# Patient Record
(demographics unavailable — no encounter records)

---

## 2024-10-23 NOTE — HISTORY OF PRESENT ILLNESS
[de-identified] : The patient comes in today for his right knee. He decided he wants to proceed with surgical intervention.

## 2024-10-23 NOTE — DISCUSSION/SUMMARY
[de-identified] : At this time, due to end-stage osteoarthritis with bone-on-bone articulation of the right knee. as supported by x-ray or MRI that demonstrated one or more of the following: periarticular osteophytes and joint space narrowing and since the patient has failed all conservative modalities for three months or more, including anti-inflammatory medication, analgesic medication, home exercises, physical therapy, cortisone shots and viscosupplementation therapies, I recommended a right total knee arthroplasty.   There are no severe medical contraindications for surgery. The patient does not have any of the following contraindications for total joint replacement surgery: Active infection; of active systemic bacteremia; or active skin infection or open wound at surgical site; or neuropathic arthritis; or severe, rapidly progressive neurologic disease; or severe medical conditions that make the risks of surgery outweigh the potential benefit.   All the risks and benefits of the surgery, including, but not limited to, anesthesia, infection, nerve and/or vascular injury, need for further surgery, DVT, PE and perioperative death were all discussed with the patient at length. In light of these, the patient wishes to proceed. The patient will be scheduled at this time.   I reviewed the plan of care, as well as a model of a total knee implant equivalent to the one that will be used for their total knee joint replacement.   The patient agreed to the plan of care, as well as the use of implants for their right knee total joint replacement.

## 2024-10-23 NOTE — DISCUSSION/SUMMARY
[de-identified] : At this time, due to end-stage osteoarthritis with bone-on-bone articulation of the right knee. as supported by x-ray or MRI that demonstrated one or more of the following: periarticular osteophytes and joint space narrowing and since the patient has failed all conservative modalities for three months or more, including anti-inflammatory medication, analgesic medication, home exercises, physical therapy, cortisone shots and viscosupplementation therapies, I recommended a right total knee arthroplasty.   There are no severe medical contraindications for surgery. The patient does not have any of the following contraindications for total joint replacement surgery: Active infection; of active systemic bacteremia; or active skin infection or open wound at surgical site; or neuropathic arthritis; or severe, rapidly progressive neurologic disease; or severe medical conditions that make the risks of surgery outweigh the potential benefit.   All the risks and benefits of the surgery, including, but not limited to, anesthesia, infection, nerve and/or vascular injury, need for further surgery, DVT, PE and perioperative death were all discussed with the patient at length. In light of these, the patient wishes to proceed. The patient will be scheduled at this time.   I reviewed the plan of care, as well as a model of a total knee implant equivalent to the one that will be used for their total knee joint replacement.   The patient agreed to the plan of care, as well as the use of implants for their right knee total joint replacement.

## 2024-10-23 NOTE — ADDENDUM
[FreeTextEntry1] : This note was written by Fleicita Avila on 10/23/2024 acting as a scribe for JOSE GARCIA III, MD

## 2024-10-23 NOTE — PHYSICAL EXAM
[de-identified] : Right Knee: Range of Motion in Degrees                                             Claimant:              Normal: Flexion Active:                        120                135-degrees Flexion Passive:                     120                135-degrees Extension Active:                    10                   0-5-degrees Extension Passive:                 10                   0-5-degrees     No weakness to flexion/extension. No evidence of instability in the AP plane or varus or valgus stress. Negative Lachman. Negative pivot shift.  Negative anterior drawer test.  Negative posterior drawer test. Negative Jose Roberto. Negative Apley grind.  No medial or lateral joint line tenderness.  Positive tenderness over the medial and lateral facet of the patella.  Positive patellofemoral crepitations.  No lateral tilting patella.  No patella apprehension.  Positive crepitation in the medial and lateral femoral condyle.  No proximal or distal swelling, edema or tenderness.  No gross motor or sensory deficits.  Mild intra-articular swelling. 2+ DP and PT pulses. No varus or valgus malalignment. Skin is intact. No rashes, scars or lesions.         [de-identified] : Ambulating with a slightly antalgic to antalgic gait.  Station:  Normal.  [de-identified] : Appearance:  Well-developed, well-nourished male in no acute distress.   [de-identified] : Radiographs, which were taken in the office today, one view of the right knee and one view of the left knee, including AP Standing, show near bone-on-bone arthritis of the right knee.

## 2024-10-23 NOTE — PHYSICAL EXAM
[de-identified] : Right Knee: Range of Motion in Degrees                                             Claimant:              Normal: Flexion Active:                        120                135-degrees Flexion Passive:                     120                135-degrees Extension Active:                    10                   0-5-degrees Extension Passive:                 10                   0-5-degrees     No weakness to flexion/extension. No evidence of instability in the AP plane or varus or valgus stress. Negative Lachman. Negative pivot shift.  Negative anterior drawer test.  Negative posterior drawer test. Negative Jose Roberto. Negative Apley grind.  No medial or lateral joint line tenderness.  Positive tenderness over the medial and lateral facet of the patella.  Positive patellofemoral crepitations.  No lateral tilting patella.  No patella apprehension.  Positive crepitation in the medial and lateral femoral condyle.  No proximal or distal swelling, edema or tenderness.  No gross motor or sensory deficits.  Mild intra-articular swelling. 2+ DP and PT pulses. No varus or valgus malalignment. Skin is intact. No rashes, scars or lesions.         [de-identified] : Ambulating with a slightly antalgic to antalgic gait.  Station:  Normal.  [de-identified] : Appearance:  Well-developed, well-nourished male in no acute distress.   [de-identified] : Radiographs, which were taken in the office today, one view of the right knee and one view of the left knee, including AP Standing, show near bone-on-bone arthritis of the right knee.

## 2024-10-23 NOTE — ADDENDUM
[FreeTextEntry1] : This note was written by Felicita Avila on 10/23/2024 acting as a scribe for JOSE GARCIA III, MD

## 2024-10-23 NOTE — HISTORY OF PRESENT ILLNESS
[de-identified] : The patient comes in today for his right knee. He decided he wants to proceed with surgical intervention.

## 2024-10-31 NOTE — CARDIOLOGY SUMMARY
[de-identified] : 6/20/24: RSR, prominent R wave V1, low voltage 10/31/24 NSR Similar to prior  [de-identified] : 6/2023:  NL LVF, Mild LVH, Mild MR/TR/NV [de-identified] : Nuclear stress test 1/18/22 No evidence of myocardial scar or Ischemia

## 2024-10-31 NOTE — PHYSICAL EXAM
Physical Therapy    Patient not seen in therapy.     Unavailable due to request no therapy today.      Re-attempt plan: per established plan of care    PT session attempted. Patient initially agreeable but then declines once beginning session. Encouragement and education provided, but patient continues to refuse.        OBJECTIVE                         Therapy procedure time and total treatment time can be found documented on the Time Entry flowsheet   [Normal S1, S2] : normal S1, S2 [Soft] : abdomen soft [Non Tender] : non-tender [Normal Gait] : normal gait [No Edema] : no edema [Normal] : moves all extremities, no focal deficits, normal speech [Alert and Oriented] : alert and oriented

## 2024-10-31 NOTE — DISCUSSION/SUMMARY
[FreeTextEntry1] : Here today for cardiac pre op evaluation prior to Right knee surgery,  He offers no complaints Cardiac testing/Medications reviewed  Patient optimized from cardiac standpoint to proceed with proposed surgery  DVT prophylaxis per surgery Labs to be reviewed with PST  [EKG obtained to assist in diagnosis and management of assessed problem(s)] : EKG obtained to assist in diagnosis and management of assessed problem(s)

## 2024-10-31 NOTE — HISTORY OF PRESENT ILLNESS
[FreeTextEntry1] : 76 yo male PMH: HTN, syncope, OA, presents today for pre op cardiac evaluation prior to Right knee surgery scheduled for 11/12/24 Claims bong feeling well no cardiovascular complaints

## 2024-12-09 NOTE — HISTORY OF PRESENT ILLNESS
[de-identified] : Post op Right Knee [de-identified] : The patient comes in today stating he feels markedly improved.  [de-identified] : Right Knee:  Range of Motion in Degrees	 	                       Claimant:	        Normal:	 Flexion Active	           125 	  135-degrees	 Flexion Passive	   125	  135-degrees	 Extension Active	    5             0-5-degrees	 Extension Passive	    5	           0-5-degrees	  No instability. The wounds are well-healed.   [de-identified] : Radiographs, which were taken in the office today, two views of right knee, show excellent position of the implant. [de-identified] : Status post right total knee arthroplasty [de-identified] : At this time, the patient is progressing nicely status post right total knee arthroplasty. I recommended outpatient therapy. He will be reassessed in two months.

## 2024-12-09 NOTE — HISTORY OF PRESENT ILLNESS
[de-identified] : Post op Right Knee [de-identified] : The patient comes in today stating he feels markedly improved.  [de-identified] : Right Knee:  Range of Motion in Degrees	 	                       Claimant:	        Normal:	 Flexion Active	           125 	  135-degrees	 Flexion Passive	   125	  135-degrees	 Extension Active	    5             0-5-degrees	 Extension Passive	    5	           0-5-degrees	  No instability. The wounds are well-healed.   [de-identified] : Radiographs, which were taken in the office today, two views of right knee, show excellent position of the implant. [de-identified] : Status post right total knee arthroplasty [de-identified] : At this time, the patient is progressing nicely status post right total knee arthroplasty. I recommended outpatient therapy. He will be reassessed in two months.

## 2024-12-09 NOTE — ADDENDUM
[FreeTextEntry1] : This note was written by Felicita Avila on 12/09/2024 acting as a scribe for JOSE GARCIA III, MD

## 2025-01-16 NOTE — HISTORY OF PRESENT ILLNESS
[FreeTextEntry1] : 76 yo male PMH: HTN, syncope, OA, presents today after right knee surgery. Pt denies chest pain or shortness of breath. He is exercising at home. He is taking propanolol for tremors and HTN. Pt sees Dr. Scott as his primary. Pt reports visits with him.

## 2025-01-16 NOTE — DISCUSSION/SUMMARY
[FreeTextEntry1] : Here today for follow up for HTN He offers no complaints Cardiac testing/Medications reviewed  Pt will be followed in 6 months. [EKG obtained to assist in diagnosis and management of assessed problem(s)] : EKG obtained to assist in diagnosis and management of assessed problem(s)

## 2025-01-16 NOTE — CARDIOLOGY SUMMARY
[de-identified] : 6/20/24: RSR, prominent R wave V1, low voltage 10/31/24 NSR Similar to prior  [de-identified] : 6/2023:  NL LVF, Mild LVH, Mild MR/TR/KY [de-identified] : Nuclear stress test 1/18/22 No evidence of myocardial scar or Ischemia

## 2025-01-28 NOTE — DISCUSSION/SUMMARY
[FreeTextEntry1] : 75-year-old man right-handed with essential tremor, doing well on present therapy.  Reviewed and discussed treatment.  No change this time. Discussed other options such as pacemaker, High-frequency sound treatment.  At this time we will continue with present management. Return to office, 9 months.

## 2025-01-28 NOTE — DATA REVIEWED
[de-identified] :   ACC: [id number] EXAM: CT BRAIN  PROCEDURE DATE: 12/21/2021    INTERPRETATION: CLINICAL STATEMENT: Pain.  TECHNIQUE: CT of the head was performed without IV contrast. RAPID artificial intelligence was utilized for the preliminary evaluation of intracranial hemorrhage.  COMPARISON: None.  FINDINGS: There is mild diffuse parenchymal volume loss. There are areas of low attenuation in the periventricular white matter likely related to mild chronic microvascular ischemic changes.  There is no acute intracranial hemorrhage, parenchymal mass, mass effect or midline shift. There is no acute territorial infarct. There is no hydrocephalus.  The cranium is intact. The visualized paranasal sinuses are well-aerated.  IMPRESSION: No acute intracranial hemorrhage or acute territorial infarct. If symptoms persist, follow-up MRI exam recommended.  --- End of Report ---      HUONG VIRAMONTES MD; Attending Radiologist This document has been electronically signed. Dec 21 2021 1:50PM

## 2025-01-28 NOTE — HISTORY OF PRESENT ILLNESS
[FreeTextEntry1] : 75-year-old man with a history of essential tremor, accompanied by his wife.  Here for follow-up visit.  Last time seen in the office was on April 2023.  Reports that been doing pretty well, had recent right knee surgery.  Reportedly has to get another 1 on the left. On Mysoline and propranolol for essential tremor.  Reports been doing pretty well, good sleep well,.  Remains pretty active, no significant deterioration in physical functioning.  No recent falls or injuries. The tremor is when he attempts to perform writing, holding utensils.  Controlled to some extent with the Mysoline 500 mg twice a day, and propranolol ER 80 mg once a day. Denies any shortness of breath, no palpitation, no syncopal events.

## 2025-01-28 NOTE — PHYSICAL EXAM
[General Appearance - Alert] : alert [General Appearance - In No Acute Distress] : in no acute distress [General Appearance - Well Nourished] : well nourished [General Appearance - Well Developed] : well developed [General Appearance - Well-Appearing] : healthy appearing [] : normal voice and communication [Oriented To Time, Place, And Person] : oriented to person, place, and time [Impaired Insight] : insight and judgment were intact [Affect] : the affect was normal [Mood] : the mood was normal [Memory Recent] : recent memory was not impaired [Memory Remote] : remote memory was not impaired [Person] : oriented to person [Place] : oriented to place [Time] : oriented to time [Cranial Nerves Optic (II)] : visual acuity intact bilaterally,  visual fields full to confrontation, pupils equal round and reactive to light [Cranial Nerves Oculomotor (III)] : extraocular motion intact [Cranial Nerves Trigeminal (V)] : facial sensation intact symmetrically [Cranial Nerves Facial (VII)] : face symmetrical [Cranial Nerves Vestibulocochlear (VIII)] : hearing was intact bilaterally [Cranial Nerves Accessory (XI - Cranial And Spinal)] : head turning and shoulder shrug symmetric [Cranial Nerves Hypoglossal (XII)] : there was no tongue deviation with protrusion [Motor Strength] : muscle strength was normal in all four extremities [Motor Handedness Right-Handed] : the patient is right hand dominant [Sensation Tactile Decrease] : light touch was intact [Abnormal Walk] : normal gait [Balance] : balance was intact [Limited Balance] : balance was intact [Past-pointing] : there was no past-pointing [Tremor] : a tremor present [Dysdiadochokinesia Bilaterally] : not present [1+] : Brachioradialis left 1+ [0] : Ankle jerk left 0 [FreeTextEntry8] : mild kinetic and postural tremor of the upper extremities, right more noticeable in the left

## 2025-02-03 NOTE — HISTORY OF PRESENT ILLNESS
[de-identified] : Post op of Right Knee [de-identified] : The patient comes in today for his right knee. He states he is doing well. [de-identified] : Right Knee:  Range of Motion in Degrees	 	                  Claimant:	Normal:	 Flexion Active	    135 	    135-degrees	 Flexion Passive   135	    135-degrees	 Extension Active     5	     0-5-degrees	 Extension Passive  5	     0-5-degrees	   [de-identified] : Radiographs, which were taken in the office today, two views of the right knee, show excellent position of the implant. [de-identified] : Status post right total knee arthroplasty [de-identified] : At this time, the patient is doing well status post right total knee arthroplasty, I encouraged him to continue working on getting full extension with therapy. He will be reassessed in three months.

## 2025-02-03 NOTE — HISTORY OF PRESENT ILLNESS
[de-identified] : Post op of Right Knee [de-identified] : The patient comes in today for his right knee. He states he is doing well. [de-identified] : Right Knee:  Range of Motion in Degrees	 	                  Claimant:	Normal:	 Flexion Active	    135 	    135-degrees	 Flexion Passive   135	    135-degrees	 Extension Active     5	     0-5-degrees	 Extension Passive  5	     0-5-degrees	   [de-identified] : Radiographs, which were taken in the office today, two views of the right knee, show excellent position of the implant. [de-identified] : Status post right total knee arthroplasty [de-identified] : At this time, the patient is doing well status post right total knee arthroplasty, I encouraged him to continue working on getting full extension with therapy. He will be reassessed in three months.

## 2025-02-03 NOTE — ADDENDUM
[FreeTextEntry1] : This note was written by Felicita Avila on 02/03/2025 acting as a scribe for JOSE GARCIA III, MD

## 2025-03-04 NOTE — HISTORY OF PRESENT ILLNESS
[FreeTextEntry1] : 72 year old man seen 01/21/2022 with complaint of urinary retention, interested in possible UroLift. He follows with Dr Echevarria. He presented to  with lethargy and hematuria 12/2021. CT revealed very distended bladder, 2.2 cm bladder stone, and bilateral renal stones without hydronephrosis. Morrison was placed. 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 RBUS showed gland volume of 110 cc.   04/05/2022: Patient presents for follow up. He is s/p cystolitholapaxy, doing well. No acute complaints. Tolerating morrison. Scheduling working on time for TURP.  06/21/2022: Patient presents for follow up. He reports voiding well, no  complaints.  Some abd bloating, but says this is improving. PVR 59 mL.  12/15/2022: Patient presents for follow up. He reports  symptoms and other medical  issues remain well controlled, improved since TURP. PVR 33 mL. He does report ED. Poor response to sildenafil now, and does not have dexterity to do ICI.   12/12/2023: Patient presents for follow up. He reports  symptoms remain unchanged from above. He reports he is voiding freely after his TURP and denies any LUTS. He reports he is taking Tadalafil with some effect but feels his erections could be better. Reports he does not have dexterity to do ICI and so would like to hold off. No new  complaints. No fevers, no chills, no nausea, no vomiting, no flank pain.  03/04/2025: Patient presents for follow up. He reports voiding well, no complaints. Again, tadalafil is improving erections but "not very much."

## 2025-03-04 NOTE — PHYSICAL EXAM
[General Appearance - Well Developed] : well developed [General Appearance - Well Nourished] : well nourished [Normal Appearance] : normal appearance [Well Groomed] : well groomed [General Appearance - In No Acute Distress] : no acute distress [Abdomen Soft] : soft [Abdomen Tenderness] : non-tender [Costovertebral Angle Tenderness] : no ~M costovertebral angle tenderness [Urinary Bladder Findings] : the bladder was normal on palpation [Prostate Tenderness] : the prostate was not tender [No Prostate Nodules] : no prostate nodules [Prostate Size ___ gm] : prostate size [unfilled] gm [Edema] : no peripheral edema [] : no respiratory distress [Respiration, Rhythm And Depth] : normal respiratory rhythm and effort [Exaggerated Use Of Accessory Muscles For Inspiration] : no accessory muscle use [Oriented To Time, Place, And Person] : oriented to person, place, and time [Affect] : the affect was normal [Mood] : the mood was normal [Not Anxious] : not anxious [Normal Station and Gait] : the gait and station were normal for the patient's age [No Focal Deficits] : no focal deficits [No Palpable Adenopathy] : no palpable adenopathy

## 2025-03-04 NOTE — ASSESSMENT
[FreeTextEntry1] : 76 yo M with bladder stones, urinary retention, s/p cystolitholapaxy and TURP. Voiding well. No complaints No intervention for now.  For ED, discussed continuing tadalafil vs retrial of ICI vs penile implants. He will continue tadalafil.  For prostate cancer screening, RUBEN benign. PSA acceptable. WIll continue annual screening.   RTO annually or sooner PRN

## 2025-05-05 NOTE — PHYSICAL EXAM
[Normal] : Gait: normal [de-identified] :   Right Knee: Range of Motion in Degrees                                     Claimant:    Normal:  Flexion Active           135             135-degrees  Flexion Passive        135             135-degrees  Extension Active           0             0-5-degrees  Extension Passive        0             0-5-degrees    No instability.  [de-identified] : Appearance:  Well-developed, well-nourished male in no acute distress.

## 2025-05-05 NOTE — DISCUSSION/SUMMARY
[de-identified] : At this time, since the patient is doing well status post right total knee replacement, he was instructed on home therapeutic modalities and to follow up with me on an as needed basis.

## 2025-05-05 NOTE — ADDENDUM
[FreeTextEntry1] : This note was written by Sandeep Cardona on 05/05/2025, acting as a scribe for Walter Melvin III, MD

## 2025-07-16 NOTE — PHYSICAL EXAM
[Full Body Skin Exam Performed] : performed [FreeTextEntry3] : Skin examination performed of the face, neck, trunk, arms, legs;  The patient is well, alert and oriented, pleasant and cooperative. Eyelids, conjunctivae, oral mucosa, digits and nails all normal.   No cervical adenopathy.  Normal findings include:  Seborrheic keratoses Angiomas + lentigines and solar damage are present in sun exposed areas;   few excoriated papules R arm, upper back, scalp, L flank  no residual AKs on face  No lesions were suspicious for malignancy.

## 2025-07-16 NOTE — ASSESSMENT
[FreeTextEntry1] : Complete skin examination is negative for malignancy; Multiple new concerns were addressed and discussed.\par  Therapeutic options and their risks and benefits; along with multiple diagnostic possibilities were discussed at length;\par  risks and benefits of skin biopsy and/or other further study were discussed;\par  \par  The patient has a history of significant sun exposure.  Continue annual exams. \par  uses OTC lotions for prurigo- covers symptomatic lesions w/bandage, does well; f/u with any persistent lesions\par  \par  Follow up for TBSE in 1 year \par  \par  \par  \par

## 2025-07-16 NOTE — HISTORY OF PRESENT ILLNESS
[de-identified] : Pt. presents for skin check; c/o few spots of concern;  Severity:  mild   Modifying factors:  none Associated symptoms:  none Context:  no association with activity

## 2025-07-24 NOTE — REASON FOR VISIT
[Hypertension] : hypertension Tissue Cultured Epidermal Autograft Text: The defect edges were debeveled with a #15 scalpel blade.  Given the location of the defect, shape of the defect and the proximity to free margins a tissue cultured epidermal autograft was deemed most appropriate.  The graft was then trimmed to fit the size of the defect.  The graft was then placed in the primary defect and oriented appropriately.

## 2025-07-24 NOTE — CARDIOLOGY SUMMARY
[de-identified] : 6/20/24: RSR, prominent R wave V1, low voltage 10/31/24 NSR Similar to prior  [de-identified] : 6/2023:  NL LVF, Mild LVH, Mild MR/TR/MT [de-identified] : Nuclear stress test 1/18/22 No evidence of myocardial scar or Ischemia

## 2025-07-24 NOTE — HISTORY OF PRESENT ILLNESS
[FreeTextEntry1] : 74 yo male PMH: HTN, syncope, OA, presents today . Pt denies chest pain or shortness of breath. He is exercising at home. He is taking propranolol for tremors and HTN. Pt sees Dr. Scott as his primary. Pt reports visits with him.

## 2025-07-24 NOTE — DISCUSSION/SUMMARY
[FreeTextEntry1] : Here today for follow up for HTN which is controlled. Recent hearing evaluation He offers no complaints Cardiac testing/Medications reviewed, labs ordered. Pt will be followed in 6 months. [EKG obtained to assist in diagnosis and management of assessed problem(s)] : EKG obtained to assist in diagnosis and management of assessed problem(s)